# Patient Record
Sex: FEMALE | Race: BLACK OR AFRICAN AMERICAN | NOT HISPANIC OR LATINO | Employment: UNEMPLOYED | URBAN - METROPOLITAN AREA
[De-identification: names, ages, dates, MRNs, and addresses within clinical notes are randomized per-mention and may not be internally consistent; named-entity substitution may affect disease eponyms.]

---

## 2012-07-24 LAB — BCS RECOMMENDATION EXT: NORMAL

## 2012-09-05 LAB — CRC RECOMMENDATION EXT: NORMAL

## 2015-05-11 LAB — BCS RECOMMENDATION EXT: NORMAL

## 2016-10-24 LAB — BCS RECOMMENDATION EXT: NORMAL

## 2017-10-31 LAB — BCS RECOMMENDATION EXT: NORMAL

## 2020-02-25 PROBLEM — E66.01 CLASS 3 SEVERE OBESITY DUE TO EXCESS CALORIES WITH SERIOUS COMORBIDITY AND BODY MASS INDEX (BMI) OF 40.0 TO 44.9 IN ADULT: Chronic | Status: ACTIVE | Noted: 2020-02-25

## 2020-02-25 PROBLEM — S32.020D COMPRESSION FRACTURE OF L2 VERTEBRA WITH ROUTINE HEALING: Status: ACTIVE | Noted: 2020-02-25

## 2020-02-25 PROBLEM — E66.813 CLASS 3 SEVERE OBESITY DUE TO EXCESS CALORIES WITH SERIOUS COMORBIDITY AND BODY MASS INDEX (BMI) OF 40.0 TO 44.9 IN ADULT: Chronic | Status: ACTIVE | Noted: 2020-02-25

## 2020-02-25 PROBLEM — S22.020A COMPRESSION FRACTURE OF T2 VERTEBRA: Status: ACTIVE | Noted: 2020-02-25

## 2020-02-25 PROBLEM — M54.50 ACUTE BILATERAL LOW BACK PAIN WITHOUT SCIATICA: Status: ACTIVE | Noted: 2020-02-25

## 2020-02-25 PROBLEM — M54.6 ACUTE BILATERAL THORACIC BACK PAIN: Status: ACTIVE | Noted: 2020-02-25

## 2020-02-25 PROBLEM — D50.8 IRON DEFICIENCY ANEMIA SECONDARY TO INADEQUATE DIETARY IRON INTAKE: Chronic | Status: ACTIVE | Noted: 2020-02-25

## 2020-04-30 PROBLEM — S32.020S CLOSED COMPRESSION FRACTURE OF L2 LUMBAR VERTEBRA, SEQUELA: Status: ACTIVE | Noted: 2020-02-25

## 2020-04-30 PROBLEM — D50.9 IRON DEFICIENCY ANEMIA: Status: ACTIVE | Noted: 2020-04-30

## 2020-04-30 PROBLEM — M51.379 DISC DISEASE, DEGENERATIVE, LUMBAR OR LUMBOSACRAL: Status: ACTIVE | Noted: 2020-04-30

## 2020-04-30 PROBLEM — M79.605 BILATERAL LEG PAIN: Status: RESOLVED | Noted: 2020-04-30 | Resolved: 2020-04-30

## 2020-04-30 PROBLEM — M79.604 BILATERAL LEG PAIN: Status: RESOLVED | Noted: 2020-04-30 | Resolved: 2020-04-30

## 2020-04-30 PROBLEM — M79.604 BILATERAL LEG PAIN: Status: ACTIVE | Noted: 2020-04-30

## 2020-04-30 PROBLEM — M51.37 DISC DISEASE, DEGENERATIVE, LUMBAR OR LUMBOSACRAL: Status: ACTIVE | Noted: 2020-04-30

## 2020-04-30 PROBLEM — M46.1 SI JOINT ARTHRITIS: Status: ACTIVE | Noted: 2020-04-30

## 2020-04-30 PROBLEM — M79.605 BILATERAL LEG PAIN: Status: ACTIVE | Noted: 2020-04-30

## 2020-04-30 PROBLEM — M85.88 OSTEOPENIA OF LUMBAR SPINE: Status: ACTIVE | Noted: 2020-04-30

## 2020-04-30 PROBLEM — M47.818 SI JOINT ARTHRITIS: Status: ACTIVE | Noted: 2020-04-30

## 2020-04-30 PROBLEM — D62 ACUTE BLOOD LOSS ANEMIA: Status: ACTIVE | Noted: 2020-04-30

## 2020-05-02 PROBLEM — S32.020A: Status: ACTIVE | Noted: 2020-05-02

## 2020-05-05 PROBLEM — M79.605 BILATERAL LEG PAIN: Status: ACTIVE | Noted: 2020-05-05

## 2020-05-05 PROBLEM — M79.604 BILATERAL LEG PAIN: Status: ACTIVE | Noted: 2020-05-05

## 2020-05-05 PROBLEM — M48.061 SPINAL STENOSIS OF LUMBAR REGION WITHOUT NEUROGENIC CLAUDICATION: Status: ACTIVE | Noted: 2020-05-05

## 2020-05-23 PROBLEM — N17.9 AKI (ACUTE KIDNEY INJURY): Status: ACTIVE | Noted: 2020-05-23

## 2020-05-23 PROBLEM — M80.00XG AGE-RELATED OSTEOPOROSIS WITH CURRENT PATHOLOGICAL FRACTURE WITH DELAYED HEALING: Status: ACTIVE | Noted: 2020-04-30

## 2020-05-25 PROBLEM — M80.00XA OSTEOPOROSIS WITH CURRENT PATHOLOGICAL FRACTURE: Status: ACTIVE | Noted: 2020-04-30

## 2020-05-28 ENCOUNTER — TELEPHONE (OUTPATIENT)
Dept: PHARMACY | Facility: CLINIC | Age: 60
End: 2020-05-28

## 2020-05-28 NOTE — TELEPHONE ENCOUNTER
DOCUMENTATION ONLY:  Prior authorization for Forteo approved from 5/28/2020 to 5/28/2021.  Case ID# [id number]    Co-pay: $0    Patient Assistance IS NOT required.     Forward to clinical pharmacist for consult & shipment.

## 2020-05-30 PROBLEM — N17.9 AKI (ACUTE KIDNEY INJURY): Status: RESOLVED | Noted: 2020-05-23 | Resolved: 2020-05-30

## 2020-05-30 PROBLEM — M54.50 ACUTE BILATERAL LOW BACK PAIN WITHOUT SCIATICA: Status: RESOLVED | Noted: 2020-02-25 | Resolved: 2020-05-30

## 2020-06-03 ENCOUNTER — TELEPHONE (OUTPATIENT)
Dept: PHARMACY | Facility: CLINIC | Age: 60
End: 2020-06-03

## 2020-06-03 NOTE — TELEPHONE ENCOUNTER
Call attempt #2 to discuss Forteo. Script was discontinued on 5/29. Per [facility], a script was sent to [facility] [address] in [place], [place]. No answer; unable to leave voicemail. MyChart has been declined.

## 2020-06-09 ENCOUNTER — TELEPHONE (OUTPATIENT)
Dept: PHARMACY | Facility: CLINIC | Age: 60
End: 2020-06-09

## 2020-06-09 NOTE — TELEPHONE ENCOUNTER
Spoke to Ms. Mejia who states that she is receiving the Forteo from Replaced by [facility] and does not need our services.

## 2020-06-10 ENCOUNTER — NURSE TRIAGE (OUTPATIENT)
Dept: ADMINISTRATIVE | Facility: CLINIC | Age: 60
End: 2020-06-10

## 2020-06-10 NOTE — TELEPHONE ENCOUNTER
Day 13: Pt contacted through the Post Procedural Symptom Tracker. No answer. No additional contact today as per post procedure protocol.

## 2020-06-10 NOTE — TELEPHONE ENCOUNTER
Reason for Disposition   No answer.  First attempt to contact caller.  Follow-up call scheduled within 15 minutes.     Only one attempt per post [facility] protocol.    Additional Information   Negative: Caller has already spoken with the PCP (or office), and has no further questions   Negative: Caller has already spoken with another triager and has no further questions   Negative: Caller has already spoken with another triager or PCP (or office), and has further questions and triager able to answer questions.   Negative: Busy signal.  First attempt to contact caller.  Follow-up call scheduled within 15 minutes.    Protocols used: NO CONTACT OR DUPLICATE CONTACT CALL-A-[place]

## 2020-06-11 NOTE — TELEPHONE ENCOUNTER
Pt contacted through the Post Procedural Symptom Tracker. No answer. No additional contact today as per post procedure protocol. dy 14   [id number] SYMPTOMS      Reason for Disposition   Information only question and nurse able to answer    Additional Information   Negative: Nursing judgment   Negative: Nursing judgment   Negative: Nursing judgment   Negative: Nursing judgment    Protocols used: NO PROTOCOL AVAILABLE - INFORMATION ONLY-A-[place]

## 2020-06-12 PROBLEM — C90.00 MULTIPLE MYELOMA: Status: ACTIVE | Noted: 2020-06-12

## 2020-06-25 PROBLEM — M54.40 BILATERAL LOW BACK PAIN WITH SCIATICA: Status: ACTIVE | Noted: 2020-06-25

## 2020-07-02 PROBLEM — K59.03 CONSTIPATION DUE TO OPIOID THERAPY: Status: ACTIVE | Noted: 2020-07-02

## 2020-07-02 PROBLEM — T40.2X5A CONSTIPATION DUE TO OPIOID THERAPY: Status: ACTIVE | Noted: 2020-07-02

## 2020-07-13 PROBLEM — G89.29 CHRONIC MIDLINE LOW BACK PAIN WITHOUT SCIATICA: Status: ACTIVE | Noted: 2020-07-13

## 2020-07-13 PROBLEM — M54.50 CHRONIC MIDLINE LOW BACK PAIN WITHOUT SCIATICA: Status: ACTIVE | Noted: 2020-07-13

## 2020-07-17 PROBLEM — G89.3 CANCER RELATED PAIN: Status: ACTIVE | Noted: 2020-07-17

## 2020-07-17 PROBLEM — T40.2X5A CONSTIPATION DUE TO OPIOID THERAPY: Status: ACTIVE | Noted: 2020-07-17

## 2020-07-17 PROBLEM — Z09 CHEMOTHERAPY FOLLOW-UP EXAMINATION: Status: ACTIVE | Noted: 2020-07-17

## 2020-07-17 PROBLEM — K59.03 CONSTIPATION DUE TO OPIOID THERAPY: Status: ACTIVE | Noted: 2020-07-17

## 2020-07-19 PROBLEM — R76.8 HEPATITIS C ANTIBODY POSITIVE IN BLOOD: Status: ACTIVE | Noted: 2020-07-19

## 2020-07-19 PROBLEM — R73.03 PRE-DIABETES: Status: ACTIVE | Noted: 2020-07-19

## 2020-07-21 PROBLEM — T45.1X5A LEUKOPENIA DUE TO ANTINEOPLASTIC CHEMOTHERAPY: Status: ACTIVE | Noted: 2020-07-21

## 2020-07-21 PROBLEM — F41.9 SEVERE ANXIETY: Status: ACTIVE | Noted: 2020-07-21

## 2020-07-21 PROBLEM — T45.1X5A ANEMIA DUE TO ANTINEOPLASTIC CHEMOTHERAPY: Status: ACTIVE | Noted: 2020-07-21

## 2020-07-21 PROBLEM — D69.6 THROMBOCYTOPENIA: Status: ACTIVE | Noted: 2020-07-21

## 2020-07-21 PROBLEM — I95.9 HYPOTENSION: Status: ACTIVE | Noted: 2020-07-21

## 2020-07-21 PROBLEM — I95.0 IDIOPATHIC HYPOTENSION: Status: ACTIVE | Noted: 2020-07-21

## 2020-07-21 PROBLEM — N17.9 ACUTE RENAL FAILURE: Status: ACTIVE | Noted: 2020-07-21

## 2020-07-21 PROBLEM — R13.19 OTHER DYSPHAGIA: Status: ACTIVE | Noted: 2020-07-21

## 2020-07-21 PROBLEM — D64.81 ANEMIA DUE TO ANTINEOPLASTIC CHEMOTHERAPY: Status: ACTIVE | Noted: 2020-07-21

## 2020-07-21 PROBLEM — D72.819 LEUKOPENIA: Status: ACTIVE | Noted: 2020-07-21

## 2020-07-21 PROBLEM — E83.39 HYPERPHOSPHATEMIA: Status: ACTIVE | Noted: 2020-07-21

## 2020-07-21 PROBLEM — D70.1 LEUKOPENIA DUE TO ANTINEOPLASTIC CHEMOTHERAPY: Status: ACTIVE | Noted: 2020-07-21

## 2020-07-22 PROBLEM — D70.1 CHEMOTHERAPY-INDUCED NEUTROPENIA: Status: ACTIVE | Noted: 2020-07-22

## 2020-07-22 PROBLEM — T45.1X5A CHEMOTHERAPY-INDUCED NEUTROPENIA: Status: ACTIVE | Noted: 2020-07-22

## 2020-07-23 PROBLEM — I95.9 HYPOTENSION: Status: RESOLVED | Noted: 2020-07-21 | Resolved: 2020-07-23

## 2020-07-23 PROBLEM — E83.39 HYPERPHOSPHATEMIA: Status: RESOLVED | Noted: 2020-07-21 | Resolved: 2020-07-23

## 2020-07-23 PROBLEM — N17.9 ACUTE RENAL FAILURE: Status: RESOLVED | Noted: 2020-07-21 | Resolved: 2020-07-23

## 2020-07-27 PROBLEM — T40.2X5A CONSTIPATION DUE TO OPIOID THERAPY: Status: RESOLVED | Noted: 2020-07-17 | Resolved: 2020-07-27

## 2020-07-27 PROBLEM — N17.9 ACUTE RENAL FAILURE: Status: ACTIVE | Noted: 2020-07-27

## 2020-07-27 PROBLEM — K59.03 CONSTIPATION DUE TO OPIOID THERAPY: Status: RESOLVED | Noted: 2020-07-17 | Resolved: 2020-07-27

## 2020-08-07 PROBLEM — Z51.5 PALLIATIVE CARE BY SPECIALIST: Status: ACTIVE | Noted: 2020-08-07

## 2020-08-07 PROBLEM — N17.9 ACUTE RENAL FAILURE: Status: RESOLVED | Noted: 2020-07-27 | Resolved: 2020-08-07
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
## 2020-08-12 PROBLEM — Z51.11 ENCOUNTER FOR ANTINEOPLASTIC CHEMOTHERAPY: Status: ACTIVE | Noted: 2020-08-12

## 2020-08-14 PROBLEM — M81.8: Status: ACTIVE | Noted: 2020-08-14

## 2020-08-14 PROBLEM — R13.19 OTHER DYSPHAGIA: Status: RESOLVED | Noted: 2020-07-21 | Resolved: 2020-08-14

## 2020-08-14 PROBLEM — C90.00: Status: ACTIVE | Noted: 2020-08-14

## 2020-09-11 PROBLEM — S22.32XA LEFT RIB FRACTURE: Status: ACTIVE | Noted: 2020-09-11

## 2020-10-16 PROBLEM — Z79.899 HIGH RISK MEDICATION USE: Status: ACTIVE | Noted: 2020-10-16

## 2020-10-16 PROBLEM — D70.1 CHEMOTHERAPY-INDUCED NEUTROPENIA: Status: RESOLVED | Noted: 2020-07-22 | Resolved: 2020-10-16
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
## 2020-10-16 PROBLEM — T45.1X5A CHEMOTHERAPY-INDUCED NEUTROPENIA: Status: RESOLVED | Noted: 2020-07-22 | Resolved: 2020-10-16

## 2020-10-19 PROBLEM — Z09 CHEMOTHERAPY FOLLOW-UP EXAMINATION: Status: RESOLVED | Noted: 2020-07-17 | Resolved: 2020-10-19

## 2020-10-30 PROBLEM — D50.9 IRON DEFICIENCY ANEMIA: Status: RESOLVED | Noted: 2020-04-30 | Resolved: 2020-10-30

## 2020-10-30 PROBLEM — Z71.89 CARE PLAN DISCUSSED WITH PATIENT: Status: ACTIVE | Noted: 2020-10-30

## 2020-11-06 PROBLEM — G47.00 INSOMNIA: Status: ACTIVE | Noted: 2020-11-06

## 2020-11-13 PROBLEM — Z01.818 ENCOUNTER FOR PRE-TRANSPLANT EVALUATION FOR STEM CELL TRANSPLANT: Status: ACTIVE | Noted: 2020-08-12

## 2020-11-19 PROBLEM — Z52.011 AUTOLOGOUS DONOR OF STEM CELLS: Status: ACTIVE | Noted: 2020-11-19

## 2020-12-11 PROBLEM — L29.9 PRURITUS: Status: ACTIVE | Noted: 2020-12-11

## 2020-12-18 PROBLEM — G62.9 NEUROPATHY: Status: ACTIVE | Noted: 2020-12-18

## 2021-01-22 PROBLEM — M79.605 LEG PAIN, DIFFUSE, LEFT: Status: RESOLVED | Noted: 2020-05-05 | Resolved: 2021-01-22

## 2021-01-22 PROBLEM — L29.9 PRURITUS: Status: RESOLVED | Noted: 2020-12-11 | Resolved: 2021-01-22

## 2021-01-22 PROBLEM — M54.6 ACUTE BILATERAL THORACIC BACK PAIN: Status: RESOLVED | Noted: 2020-02-25 | Resolved: 2021-01-22

## 2021-01-22 PROBLEM — M54.40 BILATERAL LOW BACK PAIN WITH SCIATICA: Status: RESOLVED | Noted: 2020-06-25 | Resolved: 2021-01-22

## 2021-02-09 PROBLEM — C90.01 MULTIPLE MYELOMA IN REMISSION: Status: ACTIVE | Noted: 2020-06-12

## 2021-02-09 PROBLEM — Z01.818 ENCOUNTER FOR PRE-TRANSPLANT EVALUATION FOR STEM CELL TRANSPLANT: Status: RESOLVED | Noted: 2020-08-12 | Resolved: 2021-02-09

## 2021-02-26 PROBLEM — E83.51 HYPOCALCEMIA: Status: ACTIVE | Noted: 2021-02-26

## 2021-05-25 PROBLEM — T45.1X5A LEUKOPENIA DUE TO ANTINEOPLASTIC CHEMOTHERAPY: Status: RESOLVED | Noted: 2020-07-21 | Resolved: 2021-05-25

## 2021-05-25 PROBLEM — D70.1 LEUKOPENIA DUE TO ANTINEOPLASTIC CHEMOTHERAPY: Status: RESOLVED | Noted: 2020-07-21 | Resolved: 2021-05-25

## 2021-05-25 PROBLEM — E83.51 HYPOCALCEMIA: Status: RESOLVED | Noted: 2021-02-26 | Resolved: 2021-05-25

## 2021-05-25 PROBLEM — Z52.011 AUTOLOGOUS DONOR OF STEM CELLS: Status: RESOLVED | Noted: 2020-11-19 | Resolved: 2021-05-25

## 2021-06-12 PROBLEM — T40.2X5A CONSTIPATION DUE TO OPIOID THERAPY: Status: RESOLVED | Noted: 2020-07-02 | Resolved: 2021-06-12

## 2021-06-12 PROBLEM — S32.020S CLOSED COMPRESSION FRACTURE OF L2 LUMBAR VERTEBRA, SEQUELA: Status: RESOLVED | Noted: 2020-02-25 | Resolved: 2021-06-12

## 2021-06-12 PROBLEM — S22.020A COMPRESSION FRACTURE OF T2 VERTEBRA: Status: RESOLVED | Noted: 2020-02-25 | Resolved: 2021-06-12

## 2021-06-12 PROBLEM — K59.03 CONSTIPATION DUE TO OPIOID THERAPY: Status: RESOLVED | Noted: 2020-07-02 | Resolved: 2021-06-12

## 2021-06-12 PROBLEM — D69.6 THROMBOCYTOPENIA: Status: RESOLVED | Noted: 2020-07-21 | Resolved: 2021-06-12

## 2021-06-12 PROBLEM — D50.8 IRON DEFICIENCY ANEMIA SECONDARY TO INADEQUATE DIETARY IRON INTAKE: Chronic | Status: RESOLVED | Noted: 2020-02-25 | Resolved: 2021-06-12

## 2021-11-05 PROBLEM — M25.561 CHRONIC PAIN OF BOTH KNEES: Status: ACTIVE | Noted: 2021-11-05

## 2021-11-05 PROBLEM — M25.562 CHRONIC PAIN OF BOTH KNEES: Status: ACTIVE | Noted: 2021-11-05

## 2021-11-05 PROBLEM — G89.29 CHRONIC PAIN OF BOTH KNEES: Status: ACTIVE | Noted: 2021-11-05

## 2022-02-11 PROBLEM — D64.81 ANEMIA DUE TO ANTINEOPLASTIC CHEMOTHERAPY: Status: RESOLVED | Noted: 2020-07-21 | Resolved: 2022-02-11

## 2022-02-11 PROBLEM — T45.1X5A ANEMIA DUE TO ANTINEOPLASTIC CHEMOTHERAPY: Status: RESOLVED | Noted: 2020-07-21 | Resolved: 2022-02-11

## 2022-04-08 PROBLEM — Z94.81 S/P AUTOLOGOUS BONE MARROW TRANSPLANTATION: Status: ACTIVE | Noted: 2022-04-08

## 2022-04-08 PROBLEM — G47.00 INSOMNIA: Status: RESOLVED | Noted: 2020-11-06 | Resolved: 2022-04-08

## 2022-04-08 PROBLEM — Z23 IMMUNIZATION DUE: Status: ACTIVE | Noted: 2022-04-08

## 2022-10-24 ENCOUNTER — PATIENT OUTREACH (OUTPATIENT)
Dept: ADMINISTRATIVE | Facility: HOSPITAL | Age: 62
End: 2022-10-24

## 2023-03-10 PROBLEM — N30.00 ACUTE CYSTITIS WITHOUT HEMATURIA: Status: ACTIVE | Noted: 2023-03-10

## 2024-01-25 PROBLEM — R06.83 SNORING: Status: ACTIVE | Noted: 2024-01-25

## 2024-01-25 PROBLEM — G47.19 EXCESSIVE DAYTIME SLEEPINESS: Status: ACTIVE | Noted: 2024-01-25

## 2024-01-25 PROBLEM — G47.30 SLEEP-DISORDERED BREATHING: Status: ACTIVE | Noted: 2024-01-25

## 2024-01-25 PROBLEM — R53.83 FATIGUE: Status: ACTIVE | Noted: 2024-01-25

## 2024-01-25 PROBLEM — G47.01 INSOMNIA DUE TO MEDICAL CONDITION: Status: ACTIVE | Noted: 2024-01-25

## 2024-01-25 PROBLEM — Z91.89 AT RISK FOR OBSTRUCTIVE SLEEP APNEA: Status: ACTIVE | Noted: 2024-01-25

## 2024-01-25 PROBLEM — E66.01 MORBID OBESITY WITH BMI OF 45.0-49.9, ADULT: Status: ACTIVE | Noted: 2020-02-25

## 2024-06-14 ENCOUNTER — SOCIAL WORK (OUTPATIENT)
Dept: ADMINISTRATIVE | Facility: OTHER | Age: 64
End: 2024-06-14

## 2024-06-14 NOTE — PROGRESS NOTES
Received office visit from MD informing that pt is requesting assistance with depends and bed pads through [facility]. MD states he will provide Sw with Rx's for both items. Later was provided with Rx's by MD. Called [phone] to inform her that Rx's for depends and bed pads will be sent to [facility] for assistance and Sw will call and let her know of the response. Pt verbalized understanding. No other needs were noted at that time. Will continue to follow pt and assist.       Misbah Freeman LMSW    Ext [phone]/Pager [phone]     No

## 2024-06-17 ENCOUNTER — SOCIAL WORK (OUTPATIENT)
Dept: ADMINISTRATIVE | Facility: OTHER | Age: 64
End: 2024-06-17

## 2024-06-17 NOTE — PROGRESS NOTES
Received voicemail message from Kaye with [facility] on 6/14/24 informing that they received referral for depends and bed pads but states they don't do these supplies for free and pt's seem to think they are free. Kaye reported that pt can check with the [place] [facility] in regards to assistance with incontinent supplies.  Kaye requested a return [phone]. Returned Kaye call at requested number and spoke with Marianna regarding referral. Marianna reported that they do carry the incontinent supplies but they do not bill insurance for these supplies. Marianna reported that the bed pads reusable self pay price is $19.00 and pull ups starting at $30.00-35.00 and up depending on the size. Tried calling [phone] to notify her of call received from [facility] regarding the incontinent supplies but was not reachable. Unable to leave message due to no voicemail being setup. Will continue to follow pt and assist as needed.       Misbah Freeman LMSW    Ext [phone]/Pager [phone]

## 2024-07-27 DIAGNOSIS — U07.1 COVID-19 VIRUS DETECTED: ICD-10-CM

## 2025-01-06 DIAGNOSIS — C90.01 MULTIPLE MYELOMA IN REMISSION: ICD-10-CM

## 2025-01-06 RX ORDER — HYDROCODONE BITARTRATE AND ACETAMINOPHEN 10; 325 MG/1; MG/1
1 TABLET ORAL EVERY 6 HOURS PRN
Qty: 120 TABLET | Refills: 0 | Status: SHIPPED | OUTPATIENT
Start: 2025-01-06